# Patient Record
(demographics unavailable — no encounter records)

---

## 2025-01-07 NOTE — PHYSICAL EXAM
[Chaperone Present] : A chaperone was present in the examining room during all aspects of the physical examination [85634] : A chaperone was present during the pelvic exam. [Appropriately responsive] : appropriately responsive [Alert] : alert [No Acute Distress] : no acute distress [Soft] : soft [Non-tender] : non-tender [Non-distended] : non-distended [Oriented x3] : oriented x3 [Examination Of The Breasts] : a normal appearance [No Discharge] : no discharge [No Masses] : no breast masses were palpable [Labia Majora] : normal [Labia Minora] : normal [Atrophy] : atrophy [Discharge] : a  ~M vaginal discharge was present [No Bleeding] : There was no active vaginal bleeding [Normal] : normal [Uterine Adnexae] : non-palpable

## 2025-01-07 NOTE — END OF VISIT
[FreeTextEntry3] : "I, Hayden Baer, personally scribed the services dictated to me by Dr. Theresa Schaefer MD in this documentation on 01/07/2025"   "I, Dr. Theresa Schaefer MD, personally performed the services described in this documentation on 01/07/2025 for the patient as scribed by Hayden Baer in my presence. I have reviewed and verified that all the information is accurate and true."

## 2025-01-07 NOTE — HISTORY OF PRESENT ILLNESS
[N] : Patient does not use contraception [No] : Patient does not have concerns regarding sex [Men] : men [Patient reported mammogram was normal] : Patient reported mammogram was normal [Patient reported PAP Smear was normal] : Patient reported PAP Smear was normal [postmenopausal] : postmenopausal [Y] : Positive pregnancy history [Menarche Age: ____] : age at menarche was [unfilled] [Menopause Age: ____] : age at menopause was [unfilled] [Previously active] : previously active [Mammogramdate] : 02/2024 [PapSmeardate] : 2018 [BoneDensityDate] : NEVER [ColonoscopyDate] : NEVER [LMPDate] : 2015 [PGHxTotal] : 3 [[facility]] : 2 [[facility] : 2 [PGHxABInduced] : 1 [FreeTextEntry1] : 2015

## 2025-01-07 NOTE — PLAN
[FreeTextEntry1] : Pap done today.  Prescription for mammogram screening and breast US given. Self-breast exam reviewed. We reviewed her mother's history of breast cancer and discussed genetic testing which she declines.  We discussed her incontinence. She has a normal pelvic exam except for vaginal atrophy. I explained the potential benefits of vaginal estrogen with the minimal risks. She will try. Prescription for vaginal estrogen twice weekly for incontinence given. I also recommended a uro/gyn consult.  I recommended patient see a primary care physician as she does not currently have one.   Patient referred to GI for colonoscopy as she has never had one.   Bone density ordered given her mothers history of osteoporosis.  In terms of her difficulty sleeping and brain fog, I explained that these alone are not indications for systemic hormone therapy. She denies vasomotor symptoms. I also explained that she is 10 years from menopause however less than 60 years old. I explained the other indication for systemic MHT is osteopenia which we do not know if she has. In the event she does have osteopenia, MHT could only be considered if she has mammo/sono and visit with PMD for routine health screening.  She will follow up annually and as needed.

## 2025-01-07 NOTE — REVIEW OF SYSTEMS
[Negative] : Breast [Incontinence] : incontinence [Abn Vaginal bleeding] : no abnormal vaginal bleeding [Genital Rash/Irritation] : no genital rash/irritation

## 2025-01-15 NOTE — PHYSICAL EXAM
[Chaperone Present] : A chaperone was present in the examining room during all aspects of the physical examination [No Acute Distress] : in no acute distress [Well developed] : well developed [Oriented x3] : oriented to person, place, and time [FreeTextEntry2] : Gina Black [FreeTextEntry1] : General: Not in acute distress, alert and oriented x3. Neck: Supple. No lymphadenopathy.  Abdomen: Soft, nontender, and nondistended. No obvious hepatosplenomegaly. No obvious hernias.  Pelvic Exam: Normal external female genitalia. Saddle sensory exam S2 to S4 is intact. Perineal reflexes not visualized. Urethra is hypermobile without prolapse, exudates, or lesions. Cough stress test is negative.  Post void residual was checked with I/O cath and was 20 cc of clear urine.  Normal appearing vaginal epithelium. No vaginal blood or discharge. Cervix without abnormal lesions.  Small distal posterior vaginal wall prolapse to -1.5.  Rest of the vaginal compartments are well supported.  Bimanual exam reveals a small uterus in normal positioning. No palpable adnexal masses or tenderness.

## 2025-01-15 NOTE — PROCEDURE
[Straight Catheterization] : insertion of a straight catheter [Stress Incontinence] : stress incontinence [Other ___] : [unfilled] [Patient] : the patient [___ Fr Straight Tip] : a [unfilled] in [place] straight tip catheter [None] : there were no complications with the catheter insertion [Clear] : clear [Culture] : culture [Urinalysis] : urinalysis [No Complications] : no complications [Tolerated Well] : the patient tolerated the procedure well [Post procedure instructions and information given] : Post procedure instructions and information were given and reviewed with patient. [0] : 0

## 2025-01-15 NOTE — HISTORY OF PRESENT ILLNESS
[FreeTextEntry1] : Patient is a 55 years old para 3 ( x3) who is referred by her OB/GYN Dr. Theresa Schaefer for evaluation and management of urinary incontinence. Patient reports leaking urine with coughing sneezing etc. for more than 5 years however she feels that in the last 1 year or so, her leakage has gotten worse.  Now she notices leakage with this change of position, walking etc.  She denies having any urinary urgency around the time of these accidents.  She also reports waking up to 2 times per night to urinate.  Denies sensation of incomplete bladder emptying. Denies history of frequent urinary tract infections: None.  Urine culture from 2025 was negative. No history of gross hematuria or nephrolithiasis. Daily fluid intake consists of 1 cup of coffee in the morning, water rest of the day.  Rare tea or alcohol use  Vaginal symptoms: She denies sensation of vaginal bulge or pressure.  She is rarely sexually active.  She recalls feeling dry when she was intimate last time with her  1 year ago.  She was recently prescribed Yuvafem suppositories which she has started using  Bowel symptoms: Reports intermittent constipation.  Denies diarrhea.  She sometimes notices brownish stain on her undergarments few hours after defecating.  GYN history: LMP in .  Most recent Pap smear was on 2025 which was consistent with NILM, negative high-risk HPV testing.  Past medical history: Recent weight gain, class II obesity, former smoker  Past surgical history: D&C  Current medications: None  Allergies: No known drug allergies

## 2025-01-15 NOTE — ASSESSMENT
[FreeTextEntry1] : Patient is a 55-year-old multipara with medical history significant for class II obesity, who is presenting with worsening symptoms of stress urinary incontinence for more than 1 year as well as nocturia and sleep disturbance.  On exam, she has a negative cough stress test, normal postvoid residual and a hypermobile urethra.  Stage I posterior vaginal wall prolapse also noted on exam.  Patient denies defecatory dysfunction however she reports intermittent constipation and staining.

## 2025-01-15 NOTE — DISCUSSION/SUMMARY
[FreeTextEntry1] : Patient was counseled regarding evaluation and management of urinary incontinence, overactive bladder symptoms, nocturia as well as posterior vaginal wall prolapse.  Following management plan was outlined after having a detailed discussion with the patient: 1.  Cath urine specimen was sent for urinalysis and culture.  If there is evidence of urinary tract infection, will recommend antibiotic such as Keflex 500 mg p.o. twice daily for 7 days 2.  Discussed management options for stress urinary incontinence including pelvic floor strengthening exercises, pessary, urethral bulking, midurethral sling placement as well as weight loss etc.  Pros and cons of each approach, long-term efficacy etc. was reviewed.  Procedure of retropubic mid urethral sling placement was discussed in detail including outpatient nature of the procedure, recovery time, postoperative restrictions, efficacy etc.  I provided her a brochure regarding the procedure.  She is interested in surgical management.  I recommended urodynamic testing due to absence of positive cough stress test on today's exam.  She verbalized understanding.  She will undergo urodynamic and will follow-up.  I have notified her regarding my departure from [facility].  She is comfortable with completing the workup as planned above. 3.  Discussed nighttime fluid restrictions, avoidance of bladder.  Irritants as well as other strategies to improve nocturia.  I also recommended screening for sleep apnea and diabetes.  Encouraged her to establish primary care.  Provided her contact information for [facility] in [facility] 4.  Discussed the plan finding of posterior vaginal wall prolapse.  She denies bowel symptoms.  I recommended improving bowel consistency and regularity with daily intake of fiber supplement such as Metamucil or Citrucel.  Discussed signs and symptoms of posterior vaginal wall prolapse.  At this point, she is interested in expectant management.  However, if she decides to undergo sling procedure, I will offer her concomitant posterior colpoperineorrhaphy 5.  Encouraged weight loss 6.  Follow-up after urodynamic testing